# Patient Record
Sex: FEMALE | Race: WHITE | NOT HISPANIC OR LATINO | Employment: OTHER | URBAN - METROPOLITAN AREA
[De-identification: names, ages, dates, MRNs, and addresses within clinical notes are randomized per-mention and may not be internally consistent; named-entity substitution may affect disease eponyms.]

---

## 2017-03-06 ENCOUNTER — TELEPHONE (OUTPATIENT)
Dept: SPINE | Facility: CLINIC | Age: 72
End: 2017-03-06

## 2017-03-06 DIAGNOSIS — M54.2 NECK PAIN: Primary | ICD-10-CM

## 2017-03-08 ENCOUNTER — HOSPITAL ENCOUNTER (OUTPATIENT)
Dept: RADIOLOGY | Facility: OTHER | Age: 72
Discharge: HOME | End: 2017-03-08
Attending: ORTHOPAEDIC SURGERY
Payer: MEDICARE

## 2017-03-08 ENCOUNTER — OFFICE VISIT (OUTPATIENT)
Dept: SPINE | Facility: CLINIC | Age: 72
End: 2017-03-08
Payer: MEDICARE

## 2017-03-08 VITALS
WEIGHT: 123 LBS | BODY MASS INDEX: 21 KG/M2 | HEIGHT: 64 IN | SYSTOLIC BLOOD PRESSURE: 142 MMHG | HEART RATE: 105 BPM | DIASTOLIC BLOOD PRESSURE: 67 MMHG

## 2017-03-08 DIAGNOSIS — M54.2 NECK PAIN: ICD-10-CM

## 2017-03-08 DIAGNOSIS — M47.812 CERVICAL SPONDYLOSIS WITHOUT MYELOPATHY: ICD-10-CM

## 2017-03-08 DIAGNOSIS — M43.10 ACQUIRED SPONDYLOLISTHESIS: ICD-10-CM

## 2017-03-08 DIAGNOSIS — M50.30 DEGENERATION OF CERVICAL INTERVERTEBRAL DISC: ICD-10-CM

## 2017-03-08 PROCEDURE — 1160F RVW MEDS BY RX/DR IN RCRD: CPT | Mod: S$GLB,,, | Performed by: PHYSICIAN ASSISTANT

## 2017-03-08 PROCEDURE — 1125F AMNT PAIN NOTED PAIN PRSNT: CPT | Mod: S$GLB,,, | Performed by: PHYSICIAN ASSISTANT

## 2017-03-08 PROCEDURE — 1157F ADVNC CARE PLAN IN RCRD: CPT | Mod: S$GLB,,, | Performed by: PHYSICIAN ASSISTANT

## 2017-03-08 PROCEDURE — 99999 PR PBB SHADOW E&M-EST. PATIENT-LVL III: CPT | Mod: PBBFAC,,, | Performed by: PHYSICIAN ASSISTANT

## 2017-03-08 PROCEDURE — 1159F MED LIST DOCD IN RCRD: CPT | Mod: S$GLB,,, | Performed by: PHYSICIAN ASSISTANT

## 2017-03-08 PROCEDURE — 72050 X-RAY EXAM NECK SPINE 4/5VWS: CPT | Mod: 26,,, | Performed by: RADIOLOGY

## 2017-03-08 PROCEDURE — 99204 OFFICE O/P NEW MOD 45 MIN: CPT | Mod: S$GLB,,, | Performed by: PHYSICIAN ASSISTANT

## 2017-03-08 RX ORDER — LEVOTHYROXINE SODIUM 25 UG/1
25 TABLET ORAL DAILY
COMMUNITY

## 2017-03-08 NOTE — MR AVS SNAPSHOT
"    [facility]  [address]  [address]  [place] [address]  Phone: [phone]  Fax: [phone]                  Bakari Jama   3/8/2017 9:00 AM   Office Visit    Description:  Female : 1945   Provider:  Catie Taylor PA-C   Department:  [facility]           Reason for Visit     Neck Pain           Diagnoses this Visit        Comments    Cervical spondylosis without myelopathy         Degeneration of cervical intervertebral disc         Neck pain         Acquired spondylolisthesis                To Do List           Future Appointments        Provider Department Dept Phone    2017 10:00 AM Catie Taylor PA-C [facility] [phone]      Goals (5 Years of Data)     None      Follow-Up and Disposition     Return in 3 months (on 2017).      [facility] On [facility]     [facility] On Call Nurse Care Line -  Assistance  Registered nurses in the [facility] On Call Center provide clinical advisement, health education, appointment booking, and other advisory services.  Call for this free service at [phone].             Medications           Message regarding Medications     Verify the changes and/or additions to your medication regime listed below are the same as discussed with your clinician today.  If any of these changes or additions are incorrect, please notify your healthcare provider.             Verify that the below list of medications is an accurate representation of the medications you are currently taking.  If none reported, the list may be blank. If incorrect, please contact your healthcare provider. Carry this list with you in case of emergency.           Current Medications     levothyroxine (SYNTHROID) 25 MCG tablet Take 25 mcg by mouth once daily.           Clinical Reference Information           Your Vitals Were     BP Pulse Height Weight BMI    142/67 105 5' 4" (1.626 m) 55.8 kg (123 lb) 21.11 kg/m2      Blood Pressure       "    Most Recent Value    BP  (!)  142/67      Allergies as of 3/8/2017     Sulfa (Sulfonamide Antibiotics)      Immunizations Administered on Date of Encounter - 3/8/2017     None      Orders Placed During Today's Visit      Normal Orders This Visit    Ambulatory referral to Physical Therapy - Cervical       [facility] Sign-Up     Activating your [facility] account is as easy as 1-2-3!     1) Visit [email or web address], select Sign Up Now, enter this activation code and your date of birth, then select Next.  [email or web address]  Expires: 4/22/2017  9:15 AM      2) Create a username and password to use when you visit [facility] in the future and select a security question in case you lose your password and select Next.    3) Enter your e-mail address and click Sign Up!    Additional Information  If you have questions, please e-mail [email or web address] or call [phone] to talk to our [facility] staff. Remember, [facility] is NOT to be used for urgent needs. For medical emergencies, dial 911.         Smoking Cessation     If you would like to quit smoking:   You may be eligible for free services if you are a [place] resident and started smoking cigarettes before September 1, 1988.  Call the Smoking Cessation [employer] ([facility]) toll free at ([phone]) [phone] or [phone].   Call 4-989-QUIT-NOW if you do not meet the above criteria.            Language Assistance Services     ATTENTION: Language assistance services are available, free of charge. Please call [phone].      ATENCIÓN: Si habla español, tiene a guo disposición servicios gratuitos de asistencia lingüística. Llame al [phone].     CHÚ Ý: N?u b?n nói Ti?ng Vi?t, có các d?ch v? h? tr? ngôn ng? mi?n phí dành cho b?n. G?i s? [phone].         [facility] complies with applicable Federal civil rights laws and does not discriminate on the basis of race, color, national origin, age, disability, or sex.

## 2017-03-08 NOTE — PROGRESS NOTES
Subjective:      Patient ID: Bakari Jama is a 71 y.o. female.    Chief Complaint: Neck Pain      HPI     She presents today complaining of constant left sided neck with radiation to her left shoulder x years. No arm pain. 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 Pain is better with nothing. She rates her pain as a 4 on a scale of 1-10, it can go up to a 6. Pain is dull and aching. She notes better days and bad days. She notes numbness into the left side of her face and is set up to see neurology for this. No numbness, tingling, or weakness in her arms. No known injury to her neck.     No PT, injections, or surgery on her neck. She has tried OTC advil, aleve with no significant relief. She had MRI of cervical spine in last week or so (ordered by PCP). She was told she had stenosis.     Patient denies fevers and weight loss. She denies balance issues, changes in handwriting, problems with hand dexterity, and frequent dropping of items. She admits to chills, nausea, vomiting, and night sweats.       Review of Systems   Constitution: Negative for fever, weakness, malaise/fatigue, night sweats, weight gain and weight loss.   HENT: Positive for headaches and tinnitus. Negative for hearing loss, nosebleeds and odynophagia.    Eyes: Negative for blurred vision and double vision.   Cardiovascular: Negative for chest pain, irregular heartbeat and palpitations.   Respiratory: Positive for cough. Negative for hemoptysis, shortness of breath and wheezing.    Endocrine: Positive for cold intolerance. Negative for polydipsia.   Hematologic/Lymphatic: Does not bruise/bleed easily.   Skin: Negative for dry skin, poor wound healing, rash and suspicious lesions.   Musculoskeletal: Positive for neck pain.        See HPI for pertinent positives.   Gastrointestinal: Negative for bloating, abdominal pain, constipation, diarrhea, hematochezia, melena, nausea and vomiting.    Genitourinary: Negative for bladder incontinence, dysuria, hematuria, hesitancy and incomplete emptying.   Neurological: Positive for numbness. Negative for disturbances in coordination, dizziness, focal weakness, loss of balance, paresthesias and seizures.        Positive for numbness in the face and loss of sense of taste.    Psychiatric/Behavioral: Negative for depression and hallucinations. The patient is not nervous/anxious.            Objective:        General: Bakari is well-developed, well-nourished, appears stated age, in no acute distress, alert and oriented to time, place and person.     General    Vitals reviewed.  Constitutional: She is oriented to person, place, and time. She appears well-developed and well-nourished.   Pulmonary/Chest: Effort normal.   Abdominal: She exhibits no distension.   Neurological: She is alert and oriented to person, place, and time.   Psychiatric: She has a normal mood and affect. Her behavior is normal. Judgment and thought content normal.           Gait: normal, Romberg is normal, tandem walking is normal and she is able to heel/toe stand.     On exam of the cervical spine, pt has mild left sided occipital tenderness. Mild left trapezial tenderness as well.     Patient has reasonable ROM of cervical spine without pain.     Skin in the cervical region is warm to the touch without visible rashes.     Strength Testing of Bilateral UEs shows  Right :  +5/5   Left :  +5/5  Right deltoid:  +5/5   Left deltoid:  +5/5  Right biceps:  +5/5   Left biceps:  +5/5  Right triceps:  +5/5   Left triceps:  +5/5  Right wrist extension:  +5/5  Left wrist extension:  +5/5  Right wrist flexion:  +5/5  Left wrist flexion:  +5/5  Right interosseus:  +5/5  Left interosseus:  +5/5    Sensation is grossly intact to light touch in C5, C6, C7, C8, T1 distribution.     Right shoulder has no pain with IR/ER. Good ROM of shoulder and no tenderness.   Left shoulder has no pain with IR/ER. Good  ROM of shoulder and no tenderness.     negative clonus in bilateral LEs.    negative hoffmans bilateral UEs.    DTRs:  Right biceps:  +2     Left biceps:  +2   Right brachioradialis:  +2  Left brachioradialis:  +2  Right patellar:  +2     Left patellar:  +2  Right achilles:  +2   Left achilles: +2    On gross exam of bilateral LEs, patient has full painfree ROM with no signs of clubbing, laxity, cyanosis, edema, instability, and weakness.       XRAY INTERPRETATION:  X-rays of cervical spine (AP/lat/flex/ext) dated 3/8/17 are personally reviewed and show moderate DDD/spondylosis C3-C7 with retrolisthesis C5-C6.        Assessment:       1. Cervical spondylosis without myelopathy    2. Degeneration of cervical intervertebral disc    3. Neck pain    4. Acquired spondylolisthesis           Plan:       Orders Placed This Encounter    Ambulatory referral to Physical Therapy - Cervical       Chronic constant left sided neck with radiation to her left shoulder x years. No arm pain. Known moderate DDD/spondylosis C3-C7 with retrolisthesis C5-C6. Pain likely due to underlying degeneration with myofascial component. Recent outside MRI not available for review. Treatment options reviewed with patient along with above cervical XRs. Following plan made:     - PT for cervical spine with good HEP. External script given.   - Continue prn aleve for flare ups. Take with food.   - If no improvement with above, consider referral to pain management for TPI versus cervical facet injections.   - She will bring cervical MRI to her follow up for my review.     Follow-up: Return in 3 months (on 6/8/2017). If there are any questions prior to this, the patient was instructed to contact the office.

## 2025-01-02 PROBLEM — R79.89 ELEVATED TROPONIN: Status: ACTIVE | Noted: 2025-01-02

## 2025-01-02 PROBLEM — I10 HTN (HYPERTENSION): Status: ACTIVE | Noted: 2025-01-02

## 2025-01-02 PROBLEM — I50.23 ACUTE ON CHRONIC SYSTOLIC CONGESTIVE HEART FAILURE: Status: ACTIVE | Noted: 2025-01-02

## 2025-01-02 PROBLEM — I50.9 ACUTE CHF: Status: ACTIVE | Noted: 2025-01-02

## 2025-01-16 ENCOUNTER — OFFICE VISIT (OUTPATIENT)
Dept: CARDIOLOGY | Facility: CLINIC | Age: 80
End: 2025-01-16
Payer: MEDICARE

## 2025-01-16 VITALS
HEIGHT: 62 IN | DIASTOLIC BLOOD PRESSURE: 60 MMHG | SYSTOLIC BLOOD PRESSURE: 108 MMHG | HEART RATE: 92 BPM | WEIGHT: 99.19 LBS | OXYGEN SATURATION: 98 % | BODY MASS INDEX: 18.25 KG/M2

## 2025-01-16 DIAGNOSIS — R79.89 ELEVATED TROPONIN: ICD-10-CM

## 2025-01-16 DIAGNOSIS — I50.23 ACUTE ON CHRONIC SYSTOLIC CONGESTIVE HEART FAILURE: ICD-10-CM

## 2025-01-16 DIAGNOSIS — I50.20 HFREF (HEART FAILURE WITH REDUCED EJECTION FRACTION): Primary | ICD-10-CM

## 2025-01-16 DIAGNOSIS — I27.20 PULMONARY HYPERTENSION: ICD-10-CM

## 2025-01-16 DIAGNOSIS — I10 HYPERTENSION, UNSPECIFIED TYPE: ICD-10-CM

## 2025-01-16 PROCEDURE — 99214 OFFICE O/P EST MOD 30 MIN: CPT | Mod: PBBFAC,PN

## 2025-01-16 PROCEDURE — G2211 COMPLEX E/M VISIT ADD ON: HCPCS | Mod: S$PBB,,,

## 2025-01-16 PROCEDURE — 99215 OFFICE O/P EST HI 40 MIN: CPT | Mod: S$PBB,,,

## 2025-01-16 PROCEDURE — 99999 PR PBB SHADOW E&M-EST. PATIENT-LVL IV: CPT | Mod: PBBFAC,,,

## 2025-01-16 NOTE — PROGRESS NOTES
[facility]  Cardiology Clinic Note      Chief Complaint  Chief Complaint   Patient presents with    Hospital Follow Up       HPI:  Ms. Jama is an 79-year-old female with a past medical history of hypertension, HfrEF     Patient is new to me and here to establish care  Had the pleasure of participating in Ms. Jama's care during a recent hospital admission for new onset heart failure with demand ischemia  Echo 01/2025 EF 20-25% with grade 2 diastolic dysfunction global LV hypokinesis LA moderately dilated trace aortic valve regurgitation moderate mitral valve regurgitation mild tricuspid and pulmonic valve regurgitation PASP 58  Blood pressure is soft today 108/60 with lisinopril 20 mg daily and Toprol 25 mg daily, also takes Lasix 40 mg daily  Since her hospital admission she does report fatigue most days but cough and shortness of breath has improved  Also endorses a decrease in appetite since 1/1/2025 she has lost about 8lbs  States that she smokes about 2 cigarettes per day  She is doing well with medications  Denies chest pain, SOB, or difficulty breathing  Denies palpitations, lightheadedness, dizziness, or syncope/presyncope  Denies cough, LE edema, orthopnea, or PND  Denies falls or head injures  Denies fever, chills, or NVD  Denies any recent travels or close contact with sick individuals  Denies tobacco or alcohol use  She is currently residing with daughter who was present today in offers a great deal of support with care  During the winter season she stays here in [place] with daughter in during the warmer season she lives with daughter in [place]  She is independent and active without any debilities    EKG normal sinus rhythm heart rate 90s possible left atrial enlargement nonspecific ST and T-wave abnormalities prolonged QT    Plan  Possible angiogram 1/22  Referral to advanced heart failure clinic, may be a candidate for defibrillator    Medications  Current Outpatient  Medications   Medication Sig Dispense Refill    aspirin (ECOTRIN) 81 MG EC tablet Take 1 tablet (81 mg total) by mouth once daily. 30 tablet 11    cholecalciferol, vitamin D3, (VITAMIN D3) 50 mcg (2,000 unit) Cap capsule Take 1 capsule every day by oral route for 30 days.      furosemide (LASIX) 40 MG tablet Take 1 tablet (40 mg total) by mouth 2 (two) times daily. 60 tablet 11    levothyroxine (SYNTHROID) 50 MCG tablet Take 1 tablet every day by oral route for 30 days.      lisinopriL (PRINIVIL,ZESTRIL) 20 MG tablet Take 1 tablet (20 mg total) by mouth once daily. 90 tablet 3    metoprolol succinate (TOPROL-XL) 25 MG 24 hr tablet Take 1 tablet (25 mg total) by mouth once daily. 90 tablet 3    traZODone (DESYREL) 50 MG tablet Take 1 tablet every day by oral route for 30 days.      benzonatate (TESSALON) 100 MG capsule Take 1 capsule 3 times a day by oral route for 30 days. (Patient not taking: Reported on 1/16/2025)      levothyroxine (SYNTHROID) 25 MCG tablet Take 25 mcg by mouth once daily.      liothyronine (CYTOMEL) 5 MCG Tab Take 1 tablet every day by oral route for 30 days.      nitrofurantoin, macrocrystal-monohydrate, (MACROBID) 100 MG capsule Take 1 capsule every 12 hours by oral route for 5 days.      sertraline (ZOLOFT) 50 MG tablet Take 1 tablet every day by oral route for 30 days. (Patient not taking: Reported on 1/16/2025)       No current facility-administered medications for this visit.        History  No past medical history on file.  No past surgical history on file.  Social History     Socioeconomic History    Marital status: Single   Tobacco Use    Smoking status: Some Days     Types: Cigarettes   Substance and Sexual Activity    Alcohol use: Not Currently    Drug use: Not Currently     Social Drivers of Health     Financial Resource Strain: Low Risk  (1/2/2025)    Overall Financial Resource Strain (CARDIA)     Difficulty of Paying Living Expenses: Not hard at all   Food  Insecurity: No Food Insecurity (1/2/2025)    Hunger Vital Sign     Worried About Running Out of Food in the Last Year: Never true     Ran Out of Food in the Last Year: Never true   Transportation Needs: No Transportation Needs (1/2/2025)    TRANSPORTATION NEEDS     Transportation : No   Stress: No Stress Concern Present (1/2/2025)    [employer] - Occupational Stress Questionnaire     Feeling of Stress : Not at all   Housing Stability: Low Risk  (1/2/2025)    Housing Stability Vital Sign     Unable to Pay for Housing in the Last Year: No     Homeless in the Last Year: No     Family History   Problem Relation Name Age of Onset    Fibromyalgia Mother      Hypertension Mother      Hyperlipidemia Mother          Allergies  Review of patient's allergies indicates:   Allergen Reactions    Sulfa (sulfonamide antibiotics)        Review of Systems   Review of Systems   Constitutional: Positive for decreased appetite. Negative for chills, diaphoresis, fever, malaise/fatigue, weight gain and weight loss.   Eyes:  Negative for blurred vision.   Cardiovascular:  Negative for chest pain, claudication, dyspnea on exertion, irregular heartbeat, leg swelling, near-syncope, orthopnea, palpitations, paroxysmal nocturnal dyspnea and syncope.   Respiratory:  Negative for cough, shortness of breath, snoring, sputum production and wheezing.    Endocrine: Negative for cold intolerance, heat intolerance, polydipsia, polyphagia and polyuria.   Skin:  Negative for color change, dry skin, itching, nail changes and poor wound healing.   Musculoskeletal:  Negative for back pain, gout, joint pain and joint swelling.   Gastrointestinal:  Negative for bloating, abdominal pain, constipation, diarrhea, hematemesis, hematochezia, melena, nausea and vomiting.   Genitourinary:  Negative for dysuria and hematuria.   Neurological:  Negative for dizziness, headaches, light-headedness, numbness, paresthesias and  weakness.   Psychiatric/Behavioral:  Negative for altered mental status, depression and memory loss.      Physical Exam  Vitals:    01/16/25 1332   BP: 108/60   Pulse: 92     Wt Readings from Last 1 Encounters:   01/16/25 45 kg (99 lb 3.3 oz)     Physical Exam  Constitutional:       General: She is not in acute distress.  HENT:      Head: Normocephalic and atraumatic.      Mouth/Throat:      Mouth: Mucous membranes are moist.   Eyes:      Extraocular Movements: Extraocular movements intact.      Pupils: Pupils are equal, round, and reactive to light.   Neck:      Vascular: No carotid bruit or JVD.   Cardiovascular:      Rate and Rhythm: Normal rate and regular rhythm.      Heart sounds: No murmur heard.     No friction rub. No gallop.   Pulmonary:      Effort: Pulmonary effort is normal.      Breath sounds: Normal breath sounds.   Abdominal:      General: Abdomen is flat.      Palpations: Abdomen is soft.   Musculoskeletal:      Right lower leg: No edema.      Left lower leg: No edema.   Skin:     General: Skin is warm.      Capillary Refill: Capillary refill takes less than 2 seconds.   Neurological:      General: No focal deficit present.   Psychiatric:         Mood and Affect: Mood normal.     Labs  No results displayed because visit has over 200 results.          EKG  Reviewed    Echo   Results for orders placed or performed during the hospital encounter of 01/02/25   Echo Saline Bubble? No; Ultrasound enhancing contrast? Yes   Result Value Ref Range    BSA 1.46 m2    RA Width 3.9 cm    LVOT stroke volume 40.0 cm3    LVIDd 5.4 3.5 - 6.0 cm    LV Systolic Volume 114.10 mL    LV Systolic Volume Index 77.6 mL/m2    LVIDs 4.9 (A) 2.1 - 4.0 cm    LV Diastolic Volume 141.85 mL    LV Diastolic Volume Index 96.50 mL/m2    Left Ventricular End Systolic Volume by Teichholz Method 114.10 mL    Left Ventricular End Diastolic Volume by Teichholz Method 141.85 mL    IVS 1.0 0.6 - 1.1 cm    LVOT diameter 1.9 cm    LVOT area  2.8 cm2    FS 9.3 (A) 28 - 44 %    Left Ventricle Relative Wall Thickness 0.30 cm    PW 0.8 0.6 - 1.1 cm    LV mass 180.1 g    LV Mass Index 122.5 g/m2    MV Peak E Segundo 0.86 m/s    TDI LATERAL 0.05 m/s    TDI SEPTAL 0.05 m/s    E/E' ratio 17.20 m/s    MV Peak A Segundo 0.39 m/s    TR Max Segnudo 3.55 m/s    E/A ratio 2.21     E wave deceleration time 91.81 msec    LV SEPTAL E/E' RATIO 17.20 m/s    LV LATERAL E/E' RATIO 17.20 m/s    LVOT peak segundo 0.7 m/s    Left Ventricular Outflow Tract Mean Velocity 0.44 cm/s    Left Ventricular Outflow Tract Mean Gradient 0.84 mmHg    RV- townsend basal diam 2.9 cm    RV/LV Ratio 0.54 cm    LA size 3.43 cm    Left Atrium Minor Axis 5.40 cm    Left Atrium Major Axis 6.40 cm    RA Major Axis 4.78 cm    AV regurgitation pressure 1/2 time [id number] ms    AR Max Segundo 4.33 m/s    AV mean gradient 2.7 mmHg    AV peak gradient 4.8 mmHg    Ao peak segundo 1.1 m/s    Ao VTI 21.2 cm    LVOT peak VTI 14.1 cm    AV valve area 1.9 cm²    AV Velocity Ratio 0.64     AV index (prosthetic) 0.67     MELI by Velocity Ratio 1.8 cm²    Mr max segundo 5.18 m/s    MV mean gradient 2 mmHg    MV peak gradient 5 mmHg    MV stenosis pressure 1/2 time 32.23 ms    MV valve area p 1/2 method 6.83 cm2    MV valve area by continuity eq 2.00 cm2    MV VTI 20.0 cm    Triscuspid Valve Regurgitation Peak Gradient 50 mmHg    PV PEAK VELOCITY 0.65 m/s    PV peak gradient 2 mmHg    Pulmonary Valve Mean Velocity 0.53 m/s    Ao root annulus 2.65 cm    STJ 2.55 cm    Ascending aorta 2.49 cm    IVC diameter 1.89 cm    Mean e' 0.05 m/s    ZLVIDS 4.61     ZLVIDD 2.04     RVDD 2.88 cm    AORTIC VALVE CUSP SEPERATION 1.54 cm    KAMARI 46.5 mL/m2    LA Vol 68.31 cm3    LA Vol (MOD) 61.00 mL    LA WIDTH 4.0 cm    KAMARI (MOD) 41.5 mL/m2    TV resting pulmonary artery pressure 58 mmHg    RV TB RVSP 12 mmHg    Est. RA pres 8 mmHg    Narrative      Left Ventricle: The left ventricle is mildly dilated. Normal wall   thickness. There is eccentric  hypertrophy. Global hypokinesis present.   There is severely reduced systolic function with a visually estimated   ejection fraction of 20 - 25%. Grade II diastolic dysfunction.    Right Ventricle: Normal right ventricular cavity size. Wall thickness   is normal. Systolic function is normal.    Left Atrium: Left atrium is moderately dilated.    Mitral Valve: There is moderate regurgitation.    Tricuspid Valve: There is mild regurgitation.    Pulmonic Valve: There is mild regurgitation.    Pulmonary Artery: There is moderate pulmonary hypertension. The   estimated pulmonary artery systolic pressure is 58 mmHg.         Imaging  Echo Saline Bubble? No; Ultrasound enhancing contrast? Yes    Result Date: 1/2/2025    Left Ventricle: The left ventricle is mildly dilated. Normal wall thickness. There is eccentric hypertrophy. Global hypokinesis present. There is severely reduced systolic function with a visually estimated ejection fraction of 20 - 25%. Grade II diastolic dysfunction.   Right Ventricle: Normal right ventricular cavity size. Wall thickness is normal. Systolic function is normal.   Left Atrium: Left atrium is moderately dilated.   Mitral Valve: There is moderate regurgitation.   Tricuspid Valve: There is mild regurgitation.   Pulmonic Valve: There is mild regurgitation.   Pulmonary Artery: There is moderate pulmonary hypertension. The estimated pulmonary artery systolic pressure is 58 mmHg.     X-Ray Chest AP Portable    Result Date: 1/1/2025  EXAMINATION: XR CHEST AP PORTABLE CLINICAL HISTORY: SOB; TECHNIQUE: Single frontal view of the chest was performed. COMPARISON: 12/30/2024. FINDINGS: The trachea is unremarkable.  There are calcifications of the aortic knob.  The cardiomediastinal silhouette is stable.  There is no evidence of free air beneath hemidiaphragms.  There is unchanged appearance of biapical pleural thickening.  There are probable small bilateral pleural effusions.  There is no  evidence of a pneumothorax.  There is no evidence of pneumomediastinum.  No airspace opacity is present.  There is pulmonary vascular congestion.  There is no focal consolidation.  There are degenerative changes in the osseous structures.     Small bilateral pleural effusions suspected pulmonary vascular congestion. Electronically signed by: Raymond Llanos MD Date:    01/01/2025 Time:    20:17    X-Ray Chest PA And Lateral    Result Date: 12/30/2024  EXAMINATION: XR CHEST PA AND LATERAL CLINICAL HISTORY: Chronic cough TECHNIQUE: PA and lateral views of the chest were performed. COMPARISON: None FINDINGS: Mildly enlarged cardiac silhouette.  Biapical pleuroparenchymal thickening/scarring with dystrophic calcification noted.  Mild interstitial prominence.  No lung consolidation.  Mild blunting of the right costophrenic angle, probable small effusion.  No pneumothorax.     Probable small right pleural effusion.  Short-term imaging follow-up could be performed to ensure stability/ resolution, if indicated. Electronically signed by: Ramo Lester MD Date:    12/30/2024 Time:    15:58      Prior coronary angiogram / intervention:  No priors    Assessment and Plan    HFrEF (heart failure with reduced ejection fraction) (Primary)  Acute on chronic systolic congestive heart failure  Pulmonary hypertension  Recent hospital admission for new onset heart failure with demand ischemia  Echo 01/2025 EF 20-25% with grade 2 diastolic dysfunction global LV hypokinesis LA moderately dilated trace aortic valve regurgitation moderate mitral valve regurgitation mild tricuspid and pulmonic valve regurgitation PASP 58; BNP >3500  Blood pressure soft today 100s/60s, discharged with lisinopril 20 mg daily and Toprol 25 mg daily also taking Lasix 40 mg twice a day  BNP and CMP ordered; titrate GDMT as tolerated  Possible angiogram given new onset, will consult with Interventional Cardiology  Will also need a referral to advanced heart failure  for possible defibrillator  Clinically euvolemic  Wt Readings from Last 3 Encounters:   01/16/25 45 kg (99 lb 3.3 oz)   01/02/25 48.9 kg (107 lb 12.9 oz)   03/08/17 55.8 kg (123 lb)   - Ambulatory referral/consult to Cardiology  - Ambulatory referral/consult to Adult Advanced Heart Failure; Future    Hypertension, unspecified type  As above    Elevated troponin  Plan as above    Follow Up  Next available  Repeat echo 3 months after goal directed medical therapy reached       Brandi R. Carter, FNP-C [facility] - Cardiology    Total professional time spent for the encounter: 40 minutes  Time was spent preparing to see the patient, reviewing results of prior testing, obtaining and/or reviewing separately obtained history, performing a medically appropriate examination and interview, counseling and educating the patient/family, ordering medications/tests/procedures, referring and communicating with other health care professionals, documenting clinical information in the electronic health record, and independently interpreting results.    Disclaimer: This document was created using voice recognition software ([employer] Flu[employer]). Although it may be edited, this document may contain errors related to incorrect recognition of the spoken word. Please call the physician if clarification is needed.

## 2025-01-27 ENCOUNTER — TELEPHONE (OUTPATIENT)
Dept: TRANSPLANT | Facility: CLINIC | Age: 80
End: 2025-01-27
Payer: MEDICARE

## 2025-01-27 DIAGNOSIS — I50.9 CONGESTIVE HEART FAILURE, UNSPECIFIED HF CHRONICITY, UNSPECIFIED HEART FAILURE TYPE: Primary | ICD-10-CM

## 2025-02-11 ENCOUNTER — PATIENT MESSAGE (OUTPATIENT)
Dept: CARDIOLOGY | Facility: CLINIC | Age: 80
End: 2025-02-11
Payer: MEDICARE

## 2025-02-20 ENCOUNTER — OFFICE VISIT (OUTPATIENT)
Dept: TRANSPLANT | Facility: CLINIC | Age: 80
End: 2025-02-20
Payer: MEDICARE

## 2025-02-20 ENCOUNTER — LAB VISIT (OUTPATIENT)
Dept: LAB | Facility: HOSPITAL | Age: 80
End: 2025-02-20
Attending: INTERNAL MEDICINE
Payer: MEDICARE

## 2025-02-20 VITALS
SYSTOLIC BLOOD PRESSURE: 108 MMHG | DIASTOLIC BLOOD PRESSURE: 59 MMHG | WEIGHT: 95 LBS | HEART RATE: 97 BPM | HEIGHT: 63 IN | BODY MASS INDEX: 16.83 KG/M2

## 2025-02-20 DIAGNOSIS — I10 PRIMARY HYPERTENSION: ICD-10-CM

## 2025-02-20 DIAGNOSIS — I50.23 ACUTE ON CHRONIC SYSTOLIC CONGESTIVE HEART FAILURE: Primary | ICD-10-CM

## 2025-02-20 DIAGNOSIS — I50.20 HFREF (HEART FAILURE WITH REDUCED EJECTION FRACTION): ICD-10-CM

## 2025-02-20 DIAGNOSIS — I27.20 PULMONARY HYPERTENSION: ICD-10-CM

## 2025-02-20 DIAGNOSIS — R79.89 ELEVATED TROPONIN: ICD-10-CM

## 2025-02-20 DIAGNOSIS — I50.9 CONGESTIVE HEART FAILURE, UNSPECIFIED HF CHRONICITY, UNSPECIFIED HEART FAILURE TYPE: ICD-10-CM

## 2025-02-20 LAB
ALBUMIN SERPL BCP-MCNC: 4 G/DL (ref 3.5–5.2)
ALP SERPL-CCNC: 74 U/L (ref 40–150)
ALT SERPL W/O P-5'-P-CCNC: 12 U/L (ref 10–44)
ANION GAP SERPL CALC-SCNC: 12 MMOL/L (ref 8–16)
AST SERPL-CCNC: 28 U/L (ref 10–40)
BILIRUB SERPL-MCNC: 0.4 MG/DL (ref 0.1–1)
BNP SERPL-MCNC: 503 PG/ML (ref 0–99)
BUN SERPL-MCNC: 45 MG/DL (ref 8–23)
CALCIUM SERPL-MCNC: 9.8 MG/DL (ref 8.7–10.5)
CHLORIDE SERPL-SCNC: 96 MMOL/L (ref 95–110)
CO2 SERPL-SCNC: 25 MMOL/L (ref 23–29)
CREAT SERPL-MCNC: 1.5 MG/DL (ref 0.5–1.4)
EST. GFR  (NO RACE VARIABLE): 35 ML/MIN/1.73 M^2
GLUCOSE SERPL-MCNC: 84 MG/DL (ref 70–110)
MAGNESIUM SERPL-MCNC: 2.1 MG/DL (ref 1.6–2.6)
POTASSIUM SERPL-SCNC: 4.3 MMOL/L (ref 3.5–5.1)
PROT SERPL-MCNC: 7.8 G/DL (ref 6–8.4)
SODIUM SERPL-SCNC: 133 MMOL/L (ref 136–145)
T4 FREE SERPL-MCNC: 1.76 NG/DL (ref 0.71–1.51)
TSH SERPL DL<=0.005 MIU/L-ACNC: <0.01 UIU/ML (ref 0.4–4)

## 2025-02-20 PROCEDURE — 99214 OFFICE O/P EST MOD 30 MIN: CPT | Mod: PBBFAC | Performed by: INTERNAL MEDICINE

## 2025-02-20 PROCEDURE — 84439 ASSAY OF FREE THYROXINE: CPT | Performed by: INTERNAL MEDICINE

## 2025-02-20 PROCEDURE — 80053 COMPREHEN METABOLIC PANEL: CPT | Performed by: INTERNAL MEDICINE

## 2025-02-20 PROCEDURE — 83880 ASSAY OF NATRIURETIC PEPTIDE: CPT | Performed by: INTERNAL MEDICINE

## 2025-02-20 PROCEDURE — 83735 ASSAY OF MAGNESIUM: CPT | Performed by: INTERNAL MEDICINE

## 2025-02-20 PROCEDURE — 36415 COLL VENOUS BLD VENIPUNCTURE: CPT | Performed by: INTERNAL MEDICINE

## 2025-02-20 PROCEDURE — 84443 ASSAY THYROID STIM HORMONE: CPT | Performed by: INTERNAL MEDICINE

## 2025-02-20 RX ORDER — LOSARTAN POTASSIUM 25 MG/1
25 TABLET ORAL DAILY
Qty: 90 TABLET | Refills: 3 | Status: SHIPPED | OUTPATIENT
Start: 2025-02-20 | End: 2026-02-20

## 2025-02-20 RX ORDER — FUROSEMIDE 40 MG/1
20 TABLET ORAL DAILY
Qty: 15 TABLET | Refills: 11 | Status: SHIPPED | OUTPATIENT
Start: 2025-02-20 | End: 2026-02-20

## 2025-02-20 NOTE — PATIENT INSTRUCTIONS
You have just the right amount of fluid on you.  Please adhere to a low sodium diet (no more than 1.5 grams of sodium in 24h).  3.   Follow fluid restriction of  1. no more than 2 liters in 24 hours..   4. Start losartan 25 mg daily.  5. F/u blood test in 1 week.  6. Call us 2 weeks from now for next change in medications.  7. Have CT scan done asap.  8. F/u in  3 months.

## 2025-02-20 NOTE — PROGRESS NOTES
[facility] Follow up.      Attending Physician: Celso Roberson MD.  The patient's last visit with me was on Visit date not found.         HPI.  Ms. Jama is an 79 year old female with new onset congestive heart failure. Apparently she was seen at an urgent care where there was some concern for heart failure, and a BNP and D dimer were collected. She complains of bilateral lower extremity edema, dyspnea on exertion, orthopnea.     She has undergone LHC that showed no significant CAD. She denies alcohol or drug abuse, but admits tobacco abuse for 60 years. She also has a history of thyroid disorders and recently was found hypothyroid, and dose of hormones were increased.    Review of Systems   Constitutional:  Negative for chills, diaphoresis and fever.   HENT:  Negative for nasal congestion, rhinorrhea and sore throat.    Eyes:  Negative for visual disturbance.   Cardiovascular:  Negative for chest pain.   Gastrointestinal:  Negative for abdominal pain, diarrhea, nausea and vomiting.   Genitourinary:  Negative for difficulty urinating, dysuria and hematuria.   Integumentary:  Negative for rash.   Neurological:  Negative for seizures, syncope and light-headedness.   Psychiatric/Behavioral:  Negative for agitation and hallucinations.         Past Medical History:   Diagnosis Date    CHF (congestive heart failure)     Diverticulitis     Hypertension     Hypothyroidism, unspecified     Pulmonary hypertension     Thyroid disease         Past Surgical History:   Procedure Laterality Date    CARDIAC CATHETERIZATION Right 02/10/2025    CATARACT EXTRACTION, BILATERAL Bilateral     CORONARY ANGIOGRAPHY Right 2/10/2025    Procedure: ANGIOGRAM, CORONARY ARTERY;  Surgeon: Bhavesh Lemons MD;  Location: [facility] CATH LAB;  Service: Cardiology;  Laterality: Right;        Family History   Problem  "Relation Name Age of Onset    Fibromyalgia Mother      Hypertension Mother      Hyperlipidemia Mother          Review of patient's allergies indicates:   Allergen Reactions    Sulfa (sulfonamide antibiotics)         Current Outpatient Medications   Medication Instructions    aspirin (ECOTRIN) 81 mg, Oral, Daily    cholecalciferol, vitamin D3, (VITAMIN D3) 50 mcg (2,000 unit) Cap capsule Take 1 capsule every day by oral route for 30 days.    furosemide (LASIX) 40 mg, Oral, Daily    levothyroxine (SYNTHROID) 100 mcg, Daily    liothyronine (CYTOMEL) 5 MCG Tab Take 1 tablet every day by oral route for 30 days.    metoprolol succinate (TOPROL-XL) 25 mg, Oral, Daily    traZODone (DESYREL) 50 MG tablet Take 1 tablet every day by oral route for 30 days.        Vitals:    02/20/25 1303   BP: (!) 108/59   Pulse: 97        Wt Readings from Last 3 Encounters:   02/20/25 43.1 kg (95 lb 0.3 oz)   02/10/25 42.9 kg (94 lb 11 oz)   01/16/25 45 kg (99 lb 3.3 oz)     Temp Readings from Last 3 Encounters:   02/10/25 97.9 °F (36.6 °C) (Temporal)   01/02/25 98.1 °F (36.7 °C) (Oral)     BP Readings from Last 3 Encounters:   02/20/25 (!) 108/59   02/10/25 90/61   01/16/25 108/60     Pulse Readings from Last 3 Encounters:   02/20/25 97   02/10/25 81   01/16/25 92        Body mass index is 16.83 kg/m². Estimated body surface area is 1.38 meters squared as calculated from the following:    Height as of this encounter: 5' 3" (1.6 m).    Weight as of this encounter: 43.1 kg (95 lb 0.3 oz).     Physical Exam  Constitutional:       Appearance: She is well-developed.   HENT:      Head: Normocephalic and atraumatic.      Right Ear: External ear normal.      Left Ear: External ear normal.   Eyes:      Conjunctiva/sclera: Conjunctivae normal.      Pupils: Pupils are equal, round, and reactive to light.   Neck:      Vascular: No hepatojugular reflux or JVD.   Cardiovascular:      Rate and Rhythm: Normal rate and regular rhythm.      Pulses: Intact " distal pulses.      Heart sounds: S1 normal and S2 normal. No murmur heard.     No friction rub. No gallop.   Pulmonary:      Effort: Pulmonary effort is normal.      Breath sounds: Normal breath sounds.   Abdominal:      General: Bowel sounds are normal. There is no distension.      Palpations: Abdomen is soft. There is pulsatile mass.      Tenderness: There is no abdominal tenderness. There is no guarding or rebound.       Musculoskeletal:      Cervical back: Normal range of motion and neck supple.      Right lower leg: No edema.      Left lower leg: No edema.   Neurological:      Mental Status: She is alert and oriented to person, place, and time.          Lab Results   Component Value Date     (H) 02/20/2025     (L) 02/20/2025    K 4.3 02/20/2025    MG 2.1 02/20/2025    CL 96 02/20/2025    CO2 25 02/20/2025    BUN 45 (H) 02/20/2025    CREATININE 1.5 (H) 02/20/2025    GLU 84 02/20/2025    AST 28 02/20/2025    ALT 12 02/20/2025    ALBUMIN 4.0 02/20/2025    PROT 7.8 02/20/2025    BILITOT 0.4 02/20/2025    WBC 9.04 02/06/2025    HGB 11.9 (L) 02/06/2025    HCT 35.1 (L) 02/06/2025     02/06/2025    INR 1.0 02/06/2025    TSH <0.010 (L) 02/20/2025    CHOL 204 (H) 01/02/2025    HDL 33 (L) 01/02/2025    LDLCALC 155.4 01/02/2025    TRIG 78 01/02/2025    FREET4 0.86 01/02/2025           Results for orders placed during the hospital encounter of 01/02/25    Echo Saline Bubble? No; Ultrasound enhancing contrast? Yes    Interpretation Summary    Left Ventricle: The left ventricle is mildly dilated. Normal wall thickness. There is eccentric hypertrophy. Global hypokinesis present. There is severely reduced systolic function with a visually estimated ejection fraction of 20 - 25%. Grade II diastolic dysfunction.    Right Ventricle: Normal right ventricular cavity size. Wall thickness is normal. Systolic function is normal.    Left Atrium: Left atrium is moderately dilated.    Mitral Valve: There is moderate  regurgitation.    Tricuspid Valve: There is mild regurgitation.    Pulmonic Valve: There is mild regurgitation.    Pulmonary Artery: There is moderate pulmonary hypertension. The estimated pulmonary artery systolic pressure is 58 mmHg.        Results for orders placed during the hospital encounter of 02/10/25    Cardiac catheterization    Conclusion    The 1st Sept lesion was 80% stenosed.    Non-obstructive CAD    After informed consent was obtained, patient was brought to the cardiac catheterization laboratory in a fasting state where patient was prepped and draped in the usual sterile fashion.  A preprocedure time-out was performed.  Patient receive conscious sedation with IV Versed and fentanyl.  The right wrist was anesthetized with 1 cc of lidocaine.  The right radial artery was accessed with direct ultrasound guidance and a 6 [place] Slender sheath was inserted over the wire.  Radial cocktail consisting of 200 mcg of nitroglycerin, 2.5 mg of verapamil was given via the sheath.  4000 units of heparin was given IV.  A 5 Trin[place] TIG catheter was inserted and selective coronary angiogram performed of both left and right coronary artery successfully.  Catheter was removed over guidewire.  The sheath was removed and hemostasis was obtained using a Vasc band.      The procedure log was documented by Documenter: Joselyn Velasco RT and verified by Bhavesh Lemons MD.    Date: 2/10/2025  Time: 11:24 AM         Assessment and Plan:  Acute on chronic systolic congestive heart failure  -     Ambulatory referral/consult to Adult Advanced Heart Failure  -     CT Chest Abdomen Pelvis Without Contrast (XPD); Future; Expected date: 02/20/2025  -     Basic Metabolic Panel; Future; Expected date: 02/27/2025  -     NT-Pro Natriuretic Peptide; Future; Expected date: 02/27/2025  -     CBC Auto Differential; Future; Expected date: 05/20/2025  -     Comprehensive Metabolic Panel; Future; Expected date: 05/20/2025  -     NT-Pro  Natriuretic Peptide; Future; Expected date: 05/20/2025    HFrEF (heart failure with reduced ejection fraction)    Elevated troponin    Primary hypertension    Pulmonary hypertension    Other orders  -     losartan (COZAAR) 25 MG tablet; Take 1 tablet (25 mg total) by mouth once daily.  Dispense: 90 tablet; Refill: 3  -     furosemide (LASIX) 40 MG tablet; Take 0.5 tablets (20 mg total) by mouth once daily.  Dispense: 15 tablet; Refill: 11        Chronic systolic HF, NYHA class II, stage C.  Etiology: NICM, hypertensive? Vs hypothyroidism? Vs idiopathic.  Devices: none.  Medications: metoprolol succinate, furosemide 40 mg daily.  Hemodynamic status: warm, normotensive, euvolemic.  Clinical course: new diagnosis.  Plan:  -decrease lasix from 40 mg daily to 20 mg daily  -start losartan 25 mg daily  -f/u blood test in 1 week.  -patient was asked to call us in 2 weeks for next upgrade in medications.    2. Pulsatile abd mass.  Concern for AAA.  Plan: CT abdomen. Due to renal function being impaired, I ordered wo contrast as  study.     3. Tobacco abuse. Advised to quit smoking.

## 2025-02-22 LAB — NT-PROBNP SERPL IA-MCNC: 7205 PG/ML

## 2025-02-28 ENCOUNTER — TELEPHONE (OUTPATIENT)
Dept: TRANSPLANT | Facility: CLINIC | Age: 80
End: 2025-02-28
Payer: MEDICARE

## 2025-02-28 NOTE — TELEPHONE ENCOUNTER
2/28/25  1100 am:  Told yesterday : pt did not show for scheduled labs and had cancelled it  Looked more into this, this am and see pt rescheduled to 3/13  Per Dr. Patel 2/20 clinic here is why he ordered these labs  4. Start losartan 25 mg daily.  5. F/u blood test in 1 week.    Asked  MA to contact pt to and ask pt if and when she started taking Losartan     2/28/25  1;45 pm: DARELL Marshall informed me she is on the phone w/ pt and pt reported starting the Losartan 2/22   I asked her to move the lab appt up to early next week from 3/13 in that is too late from the time frame Dr. Crooks asked  She did  Pt tera to 3/5  Not able to go today or Monday and Tuesday 3/4 is [place] holiday    Sending to Dr. Crooks as myranda

## 2025-03-06 ENCOUNTER — TELEPHONE (OUTPATIENT)
Dept: TRANSPLANT | Facility: CLINIC | Age: 80
End: 2025-03-06
Payer: MEDICARE

## 2025-03-06 NOTE — TELEPHONE ENCOUNTER
Labs reviewed. WNL for patient. Called patient and notified her of lab results. Patient states she feels good, no sob or edema. No complaints of pain. All questions answered.

## 2025-03-12 RX ORDER — ROSUVASTATIN CALCIUM 20 MG/1
10 TABLET, COATED ORAL DAILY
Qty: 30 TABLET | Refills: 3 | Status: SHIPPED | OUTPATIENT
Start: 2025-03-12 | End: 2026-03-12

## 2025-04-09 ENCOUNTER — TELEPHONE (OUTPATIENT)
Dept: TRANSPLANT | Facility: CLINIC | Age: 80
End: 2025-04-09
Payer: MEDICARE

## 2025-04-09 NOTE — TELEPHONE ENCOUNTER
----- Message from Cayla sent at 4/9/2025 10:42 AM CDT -----  Regarding: Direction  Pharmacy calling to get correct directions on patient rosuvastatin (CRESTOR) 20 MG tablet. Patient suppose to be taking a half but taking a whole. Please call back @ [phone]. Thank you Cayla

## 2025-04-09 NOTE — TELEPHONE ENCOUNTER
----- Message from Cayla sent at 4/9/2025 10:42 AM CDT -----  Regarding: Direction  Pharmacy calling to get correct directions on patient rosuvastatin (CRESTOR) 20 MG tablet. Patient suppose to be taking a half but taking a whole. Please call back @ [phone]. Thank you Cayla

## 2025-04-09 NOTE — TELEPHONE ENCOUNTER
Spoke to patient she states that she has been taking a whole 20 mg tablet because they are so small she cannot cut it in half.  I messaged Dr. Tierney about it to see if we can send a 10 mg tablet or if it is ok for her to take 20 mg.  Spoke to Dr Tierney he stated it is ok for her to take the 20 mg tablet so I spoke to [facility] and notified them to change the directions to 1 tab every evening.

## 2025-06-04 RX ORDER — ROSUVASTATIN CALCIUM 20 MG/1
20 TABLET, COATED ORAL NIGHTLY
Qty: 90 TABLET | Refills: 0 | Status: SHIPPED | OUTPATIENT
Start: 2025-06-04

## 2025-08-22 ENCOUNTER — TELEPHONE (OUTPATIENT)
Dept: TRANSPLANT | Facility: CLINIC | Age: 80
End: 2025-08-22
Payer: MEDICARE

## 2025-08-26 ENCOUNTER — OFFICE VISIT (OUTPATIENT)
Dept: TRANSPLANT | Facility: CLINIC | Age: 80
End: 2025-08-26
Payer: MEDICARE

## 2025-08-26 ENCOUNTER — LAB VISIT (OUTPATIENT)
Dept: LAB | Facility: HOSPITAL | Age: 80
End: 2025-08-26
Attending: INTERNAL MEDICINE
Payer: MEDICARE

## 2025-08-26 VITALS
HEIGHT: 63 IN | SYSTOLIC BLOOD PRESSURE: 107 MMHG | WEIGHT: 102.31 LBS | OXYGEN SATURATION: 98 % | DIASTOLIC BLOOD PRESSURE: 49 MMHG | BODY MASS INDEX: 18.13 KG/M2 | HEART RATE: 78 BPM

## 2025-08-26 DIAGNOSIS — I50.20 HFREF (HEART FAILURE WITH REDUCED EJECTION FRACTION): Primary | ICD-10-CM

## 2025-08-26 PROCEDURE — 1126F AMNT PAIN NOTED NONE PRSNT: CPT | Mod: CPTII,S$GLB,, | Performed by: INTERNAL MEDICINE

## 2025-08-26 PROCEDURE — 3074F SYST BP LT 130 MM HG: CPT | Mod: CPTII,S$GLB,, | Performed by: INTERNAL MEDICINE

## 2025-08-26 PROCEDURE — 3078F DIAST BP <80 MM HG: CPT | Mod: CPTII,S$GLB,, | Performed by: INTERNAL MEDICINE

## 2025-08-26 PROCEDURE — 99999 PR PBB SHADOW E&M-EST. PATIENT-LVL IV: CPT | Mod: PBBFAC,,, | Performed by: INTERNAL MEDICINE

## 2025-08-26 PROCEDURE — 3288F FALL RISK ASSESSMENT DOCD: CPT | Mod: CPTII,S$GLB,, | Performed by: INTERNAL MEDICINE

## 2025-08-26 PROCEDURE — 1101F PT FALLS ASSESS-DOCD LE1/YR: CPT | Mod: CPTII,S$GLB,, | Performed by: INTERNAL MEDICINE

## 2025-08-26 PROCEDURE — 1159F MED LIST DOCD IN RCRD: CPT | Mod: CPTII,S$GLB,, | Performed by: INTERNAL MEDICINE

## 2025-08-26 PROCEDURE — 99214 OFFICE O/P EST MOD 30 MIN: CPT | Mod: S$GLB,,, | Performed by: INTERNAL MEDICINE

## 2025-08-26 RX ORDER — FUROSEMIDE 40 MG/1
20 TABLET ORAL
Qty: 15 TABLET | Refills: 11 | Status: SHIPPED | OUTPATIENT
Start: 2025-08-26 | End: 2026-08-26

## 2025-08-26 RX ORDER — SPIRONOLACTONE 25 MG/1
25 TABLET ORAL DAILY
Qty: 30 TABLET | Refills: 11 | Status: SHIPPED | OUTPATIENT
Start: 2025-08-26 | End: 2025-08-26

## 2025-09-02 ENCOUNTER — TELEPHONE (OUTPATIENT)
Dept: TRANSPLANT | Facility: CLINIC | Age: 80
End: 2025-09-02
Payer: MEDICARE